# Patient Record
(demographics unavailable — no encounter records)

---

## 2025-01-15 NOTE — REVIEW OF SYSTEMS
[Patient Intake Form Reviewed] : Patient intake form was reviewed [Dysphagia] : dysphagia [Hoarseness] : hoarseness [Odynophagia] : odynophagia [Fever] : no fever [Fatigue] : no fatigue [Recent Change In Weight] : ~T no recent weight change [Eye Pain] : no eye pain [Vision Problems] : no vision problems [Chest Pain] : no chest pain [Lower Ext Edema] : no lower extremity edema [Shortness Of Breath] : no shortness of breath [SOB on Exertion] : no shortness of breath during exertion [Abdominal Pain] : no abdominal pain [Vomiting] : no vomiting [Constipation] : no constipation [Dysuria] : no dysuria [Joint Pain] : no joint pain [Skin Rash] : no skin rash [Easy Bleeding] : no tendency for easy bleeding [Easy Bruising] : no tendency for easy bruising [Swollen Glands] : no swollen glands

## 2025-01-15 NOTE — ASSESSMENT
[FreeTextEntry1] : 73 year old M with PMHx of SCC of the facial skin s/p parotidectomy in 2020 at [facility] with adjuvant RT who was diagnosed with supraglottic SCC.  -Discussed with Mr Finch definitive treatment of his cancer with chemoRT.  I explained that chemotherapy serves as a radiosensitizer to make the radiation more effective as well as to treat the blood stream and decrease the risk of metastases.  I have recommended weekly cisplatin and have provided patient drug information for further review. He will go for simulation next week and he will return to initiate chemoRT  01/15/25: Mr Finch returns for follow up after a long absence.  He had a CT neck with contrast done on 12/11/25 which again demonstrated an extensively inflitrative enhancing, irregular, and ulcerated soft tissue mass centered in the left lateral oropharynx measuring 6.9x 3.3x 3.3cm. Also involvement of the vocal cords and metastases to the left lymph nodes.  He reports being stronger and having gained weight since being in rehab. -Needs to see Dr Lucretia jiménez who will likely need to do another simulation given the amount of time since initial evaluation. If creatinine is still WNL, will plan for weekly cisplatin and he will return to initiate treatment

## 2025-01-15 NOTE — REASON FOR VISIT
[Follow-Up Visit] : a follow-up [Initial Consultation] : an initial consultation [FreeTextEntry2] : SCC

## 2025-01-15 NOTE — HISTORY OF PRESENT ILLNESS
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  He c/o throat pain and dysphagia x 1 year, worsening over the past few months. CT neck at [facility] showed a left lower pharyngeal/supraglottic laryngeal mass with epiglottic involvment. Enlarged left level 2A LN, likely metastatic.     Laryngoscopy with Dr. Donis showed larynx with mass involving the lateral L. BOT, supraglottic larynx and extending towards the pyriform sinus.  DL with biopsy of the larynx and supraglottic mass showed squamous cell carcinoma, moderately differentiated. Perineural invasion is seen.  Staging PET showed a focus of uptake at the left palatine tonsil region extends inferiorly along the posterior tongue to the infraglottic region. This is seen with an intensely FDG-avid large left level IIB node. Smaller nodes further inferiorly on the left at level 2/3 as well as a suspected small right level 2/3 node. Findings are collectively consistent with left tonsillar cancer with left neck metastatic lymphadenopathy, with additional smaller nodes bilaterally raising the possibility of additional metastatic disease.  Increased uptake anteromedially at the right true vocal cord. Intense activity seen posterior to the left true vocal cord, possibly within the esophagus, which appears thickened and asymmetric. Further evaluation may be helpful to exclude additional pathology.  Focus of increased uptake in the anterior portion of the face, potentially within the subcutaneous region of the right nasolabial fold or involving the bony maxilla. Uncertainty exists regarding the precise location. 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  Hazy nodularity anterior to the right major fissure, along with atelectasis. This is likely postinfectious or inflammatory. [de-identified] : 01/15/25: Mr Finch returns for follow up after a long absence.  He had a CT neck with contrast done on 12/11/25 which again demonstrated an extensively inflitrative enhancing, irregular, and ulcerated soft tissue mass centered in the left lateral oropharynx measuring 6.9x 3.3x 3.3cm. Also involvement of the vocal cords and metastases to the left lymph nodes.  He reports being stronger and having gained weight since being in rehab.

## 2025-02-20 NOTE — VITALS
[Maximal Pain Intensity: 3/10] : 3/10 [Least Pain Intensity: 1/10] : 1/10 [70: Cares for self; unalbe to carry on normal activity or do active work.] : 70: Cares for self; unable to carry on normal activity or do active work. [ECOG Performance Status: 2 - Ambulatory and capable of all self care but unable to carry out any work activities] : Performance Status: 2 - Ambulatory and capable of all self care but unable to carry out any work activities. Up and about more than 50% of waking hours [1 - Distress Level] : Distress Level: 1

## 2025-02-20 NOTE — DISEASE MANAGEMENT
[Clinical] : TNM Stage: c [TTNM] : 3 [NTNM] : 2 [MTNM] : 0 [IV] : IV [de-identified] : 494 [de-identified] : 5589 [de-identified] : supraglottic larnx and neck

## 2025-02-20 NOTE — DISEASE MANAGEMENT
[Clinical] : TNM Stage: c [TTNM] : 3 [NTNM] : 2 [MTNM] : 0 [IV] : IV [de-identified] : 807 [de-identified] : 7049 [de-identified] : supraglottic larnx and neck

## 2025-02-24 NOTE — DISEASE MANAGEMENT
[Clinical] : TNM Stage: c [IV] : IV [TTNM] : 3 [NTNM] : 2 [MTNM] : 0 [de-identified] : 7860 [de-identified] : 0289 [de-identified] : supraglottic larnx and neck

## 2025-02-24 NOTE — VITALS
[Maximal Pain Intensity: 3/10] : 3/10 [Least Pain Intensity: 1/10] : 1/10 [70: Cares for self; unalbe to carry on normal activity or do active work.] : 70: Cares for self; unable to carry on normal activity or do active work. [1 - Distress Level] : Distress Level: 1

## 2025-02-26 NOTE — ASSESSMENT
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  -Dr Jeffers discussed with Mr Finch definitive treatment of his cancer with chemoRT and explained that chemotherapy serves as a radiosensitizer to make the radiation more effective as well as to treat the blood stream and decrease the risk of metastases.   -recommended weekly cisplatin and RT -initially seen on 9/4/24 finally returned for follow up after a long absence on 1/15/25.   -restaging CT neck with contrast done on 12/11/24 which again demonstrated an extensively inflitrative enhancing, irregular, and ulcerated soft tissue mass centered in the left lateral oropharynx measuring 6.9x 3.3x 3.3cm. Also involvement of the vocal cords and metastases to the left lymph nodes.   -started CRT with weekly cisplatin on 2/12/25  -sent to ED after treatment last week for PEG revision, patient went to [facility] where PEG was replaced.  ANALI Ashton will follow  -2L of fluid via PEG daily -tolerated treatment well so far with expected side effects but not yet severe -RTC Qday for RT, Qweek for Cisplatin and Q7-14 days for MD/PA follow ups [Future Reassessment of Pain Scale] : Future reassessment of pain scale    [Curative] : Goals of care discussed with patient: Curative [Palliative Care Plan] : not applicable at this time

## 2025-02-26 NOTE — HISTORY OF PRESENT ILLNESS
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  He c/o throat pain and dysphagia x 1 year, worsening over the past few months. CT neck at [facility] showed a left lower pharyngeal/supraglottic laryngeal mass with epiglottic involvment. Enlarged left level 2A LN, likely metastatic.     Laryngoscopy with Dr. Donis showed larynx with mass involving the lateral L. BOT, supraglottic larynx and extending towards the pyriform sinus.  DL with biopsy of the larynx and supraglottic mass showed squamous cell carcinoma, moderately differentiated. Perineural invasion is seen.  Staging PET showed a focus of uptake at the left palatine tonsil region extends inferiorly along the posterior tongue to the infraglottic region. This is seen with an intensely FDG-avid large left level IIB node. Smaller nodes further inferiorly on the left at level 2/3 as well as a suspected small right level 2/3 node. Findings are collectively consistent with left tonsillar cancer with left neck metastatic lymphadenopathy, with additional smaller nodes bilaterally raising the possibility of additional metastatic disease.  Increased uptake anteromedially at the right true vocal cord. Intense activity seen posterior to the left true vocal cord, possibly within the esophagus, which appears thickened and asymmetric. Further evaluation may be helpful to exclude additional pathology.  Focus of increased uptake in the anterior portion of the face, potentially within the subcutaneous region of the right nasolabial fold or involving the bony maxilla. Uncertainty exists regarding the precise location. 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  Hazy nodularity anterior to the right major fissure, along with atelectasis. This is likely postinfectious or inflammatory. [de-identified] : 01/15/25: Mr Finch returns for follow up after a long absence.  He had a CT neck with contrast done on 12/11/25 which again demonstrated an extensively inflitrative enhancing, irregular, and ulcerated soft tissue mass centered in the left lateral oropharynx measuring 6.9x 3.3x 3.3cm. Also involvement of the vocal cords and metastases to the left lymph nodes.  He reports being stronger and having gained weight since being in rehab.  2/26/25 Patient presents for follow up and C3 of CRT with weekly Cisplatin for supraglottic SCC. + Fatigue. + Xerostomia. + Excessive thick oral secretions. Remains NPO, 100% of calories via PEG. Denies N/V. No recent fevers/chills.

## 2025-03-03 NOTE — DISEASE MANAGEMENT
[Clinical] : TNM Stage: c [TTNM] : 3 [NTNM] : 2 [MTNM] : 0 [IV] : IV [de-identified] : 2515 [de-identified] : 4759 [de-identified] : supraglottic larnx and neck

## 2025-03-10 NOTE — DISEASE MANAGEMENT
[Clinical] : TNM Stage: c [TTNM] : 3 [NTNM] : 2 [MTNM] : 0 [IV] : IV [de-identified] : 3000 cGy [de-identified] : 6000 cGy [de-identified] : supraglottic larnx and neck

## 2025-03-10 NOTE — DISEASE MANAGEMENT
[Clinical] : TNM Stage: c [TTNM] : 3 [NTNM] : 2 [MTNM] : 0 [IV] : IV [de-identified] : 3000 cGy [de-identified] : 6000 cGy [de-identified] : supraglottic larnx and neck

## 2025-03-10 NOTE — DISEASE MANAGEMENT
[Clinical] : TNM Stage: c [TTNM] : 3 [NTNM] : 2 [MTNM] : 0 [IV] : IV [de-identified] : 3000 cGy [de-identified] : 6000 cGy [de-identified] : supraglottic larnx and neck

## 2025-03-10 NOTE — REASON FOR VISIT
[Other: _____] : [unfilled] [Routine On-Treatment] : a routine on-treatment visit for [Head and Neck Cancer] : head and neck cancer

## 2025-03-21 NOTE — DISEASE MANAGEMENT
[Clinical] : TNM Stage: c [IV] : IV [TTNM] : 3 [NTNM] : 2 [MTNM] : 0 [de-identified] : 3800 cGy [de-identified] : 6000 cGy [de-identified] : supraglottic larnx and neck

## 2025-03-21 NOTE — REVIEW OF SYSTEMS
[Dysphagia: Grade 1 - Symptomatic, able to eat regular diet] : Dysphagia: Grade 1 - Symptomatic, able to eat regular diet [Edema Limbs: Grade 0] : Edema Limbs: Grade 0  [Fatigue: Grade 0] : Fatigue: Grade 0 [Localized Edema: Grade 0] : Localized Edema: Grade 0  [Neck Edema: Grade 0] : Neck Edema: Grade 0 [Mucositis Oral: Grade 0] : Mucositis Oral: Grade 0  [Xerostomia: Grade 1 - Symptomatic (e.g., dry or thick saliva) without significant dietary alteration; unstimulated saliva flow >0.2 ml/min] : Xerostomia: Grade 1 - Symptomatic (e.g., dry or thick saliva) without significant dietary alteration; unstimulated saliva flow >0.2 ml/min [Dysgeusia: Grade 0] : Dysgeusia: Grade 0 [Skin Hyperpigmentation: Grade 0] : Skin Hyperpigmentation: Grade 0

## 2025-03-21 NOTE — PHYSICAL EXAM
[Normal] : oriented to person, place and time, the affect was normal, the mood was normal and not anxious [Extraocular Movements] : extraocular movements were intact [Outer Ear] : the ears and nose were normal in appearance [de-identified] : dry mucous membranes, no significant mucositis

## 2025-03-24 NOTE — REVIEW OF SYSTEMS
[Edema Limbs: Grade 0] : Edema Limbs: Grade 0  [Fatigue: Grade 0] : Fatigue: Grade 0 [Localized Edema: Grade 0] : Localized Edema: Grade 0  [Neck Edema: Grade 0] : Neck Edema: Grade 0 [Mucositis Oral: Grade 0] : Mucositis Oral: Grade 0  [Xerostomia: Grade 1 - Symptomatic (e.g., dry or thick saliva) without significant dietary alteration; unstimulated saliva flow >0.2 ml/min] : Xerostomia: Grade 1 - Symptomatic (e.g., dry or thick saliva) without significant dietary alteration; unstimulated saliva flow >0.2 ml/min [Dysgeusia: Grade 0] : Dysgeusia: Grade 0 [Skin Hyperpigmentation: Grade 0] : Skin Hyperpigmentation: Grade 0

## 2025-03-24 NOTE — DISEASE MANAGEMENT
[Clinical] : TNM Stage: c [IV] : IV [TTNM] : 3 [NTNM] : 2 [MTNM] : 0 [de-identified] : 3800 cGy [de-identified] : 6000 cGy [de-identified] : supraglottic larnx and neck

## 2025-03-27 NOTE — HISTORY OF PRESENT ILLNESS
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  He c/o throat pain and dysphagia x 1 year, worsening over the past few months. CT neck at [facility] showed a left lower pharyngeal/supraglottic laryngeal mass with epiglottic involvment. Enlarged left level 2A LN, likely metastatic.     Laryngoscopy with Dr. Donis showed larynx with mass involving the lateral L. BOT, supraglottic larynx and extending towards the pyriform sinus.  DL with biopsy of the larynx and supraglottic mass showed squamous cell carcinoma, moderately differentiated. Perineural invasion is seen.  Staging PET showed a focus of uptake at the left palatine tonsil region extends inferiorly along the posterior tongue to the infraglottic region. This is seen with an intensely FDG-avid large left level IIB node. Smaller nodes further inferiorly on the left at level 2/3 as well as a suspected small right level 2/3 node. Findings are collectively consistent with left tonsillar cancer with left neck metastatic lymphadenopathy, with additional smaller nodes bilaterally raising the possibility of additional metastatic disease.  Increased uptake anteromedially at the right true vocal cord. Intense activity seen posterior to the left true vocal cord, possibly within the esophagus, which appears thickened and asymmetric. Further evaluation may be helpful to exclude additional pathology.  Focus of increased uptake in the anterior portion of the face, potentially within the subcutaneous region of the right nasolabial fold or involving the bony maxilla. Uncertainty exists regarding the precise location. 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  Hazy nodularity anterior to the right major fissure, along with atelectasis. This is likely postinfectious or inflammatory. [de-identified] : 01/15/25: Mr Finch returns for follow up after a long absence.  He had a CT neck with contrast done on 12/11/25 which again demonstrated an extensively inflitrative enhancing, irregular, and ulcerated soft tissue mass centered in the left lateral oropharynx measuring 6.9x 3.3x 3.3cm. Also involvement of the vocal cords and metastases to the left lymph nodes.  He reports being stronger and having gained weight since being in rehab.  2/26/25 Patient presents for follow up and C3 of CRT with weekly Cisplatin for supraglottic SCC.   + Fatigue. + Xerostomia. + Excessive thick oral secretions. Remains NPO, 100% of calories via PEG. Denies N/V. No recent fevers/chills.  3/26/25 Patient presents for follow up and C4 of CRT with weekly Cisplatin for supraglottic SCC.  Treatment delayed by hospitalization for sepsis.  Feels back to baseline. 100% calories via PEG.  + Xerostomia. + Odynophagia. Thick secretions currently resolved. No diarrhea. No fevers since discharge from hospital.

## 2025-03-27 NOTE — HISTORY OF PRESENT ILLNESS
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  He c/o throat pain and dysphagia x 1 year, worsening over the past few months. CT neck at [facility] showed a left lower pharyngeal/supraglottic laryngeal mass with epiglottic involvment. Enlarged left level 2A LN, likely metastatic.     Laryngoscopy with Dr. Donis showed larynx with mass involving the lateral L. BOT, supraglottic larynx and extending towards the pyriform sinus.  DL with biopsy of the larynx and supraglottic mass showed squamous cell carcinoma, moderately differentiated. Perineural invasion is seen.  Staging PET showed a focus of uptake at the left palatine tonsil region extends inferiorly along the posterior tongue to the infraglottic region. This is seen with an intensely FDG-avid large left level IIB node. Smaller nodes further inferiorly on the left at level 2/3 as well as a suspected small right level 2/3 node. Findings are collectively consistent with left tonsillar cancer with left neck metastatic lymphadenopathy, with additional smaller nodes bilaterally raising the possibility of additional metastatic disease.  Increased uptake anteromedially at the right true vocal cord. Intense activity seen posterior to the left true vocal cord, possibly within the esophagus, which appears thickened and asymmetric. Further evaluation may be helpful to exclude additional pathology.  Focus of increased uptake in the anterior portion of the face, potentially within the subcutaneous region of the right nasolabial fold or involving the bony maxilla. Uncertainty exists regarding the precise location. 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  Hazy nodularity anterior to the right major fissure, along with atelectasis. This is likely postinfectious or inflammatory. [de-identified] : 01/15/25: Mr Finch returns for follow up after a long absence.  He had a CT neck with contrast done on 12/11/25 which again demonstrated an extensively inflitrative enhancing, irregular, and ulcerated soft tissue mass centered in the left lateral oropharynx measuring 6.9x 3.3x 3.3cm. Also involvement of the vocal cords and metastases to the left lymph nodes.  He reports being stronger and having gained weight since being in rehab.  2/26/25 Patient presents for follow up and C3 of CRT with weekly Cisplatin for supraglottic SCC.   + Fatigue. + Xerostomia. + Excessive thick oral secretions. Remains NPO, 100% of calories via PEG. Denies N/V. No recent fevers/chills.  3/26/25 Patient presents for follow up and C4 of CRT with weekly Cisplatin for supraglottic SCC.  Treatment delayed by hospitalization for sepsis.  Feels back to baseline. 100% calories via PEG.  + Xerostomia. + Odynophagia. Thick secretions currently resolved. No diarrhea. No fevers since discharge from hospital.

## 2025-03-27 NOTE — ASSESSMENT
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  -Dr Jeffers discussed with Mr Finch definitive treatment of his cancer with chemoRT and explained that chemotherapy serves as a radiosensitizer to make the radiation more effective as well as to treat the blood stream and decrease the risk of metastases.   -recommended weekly cisplatin and RT -initially seen on 9/4/24 finally returned for follow up after a long absence on 1/15/25.   -restaging CT neck with contrast done on 12/11/24 which again demonstrated an extensively inflitrative enhancing, irregular, and ulcerated soft tissue mass centered in the left lateral oropharynx measuring 6.9x 3.3x 3.3cm. Also involvement of the vocal cords and metastases to the left lymph nodes.   -started CRT with weekly cisplatin on 2/12/25  -sent to ED after treatment last week for PEG revision, patient went to [facility] where PEG was replaced.  ANALI Ashton will follow  -2L of fluid via PEG daily -treatment delayed by long hospitalization for sepsis.  Patient looks well today especially considering this long ICU admission.  CBC shows anemia but near baseline. -patient looks ready to resume chemotherapy portion of treatment. Resumed RT on 3/24 -RTC Qday for RT, Qweek for Cisplatin and Q7-14 days for MD/PA follow ups

## 2025-03-27 NOTE — ASSESSMENT
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  -Dr Jeffers discussed with Mr Finch definitive treatment of his cancer with chemoRT and explained that chemotherapy serves as a radiosensitizer to make the radiation more effective as well as to treat the blood stream and decrease the risk of metastases.   -recommended weekly cisplatin and RT -initially seen on 9/4/24 finally returned for follow up after a long absence on 1/15/25.   -restaging CT neck with contrast done on 12/11/24 which again demonstrated an extensively inflitrative enhancing, irregular, and ulcerated soft tissue mass centered in the left lateral oropharynx measuring 6.9x 3.3x 3.3cm. Also involvement of the vocal cords and metastases to the left lymph nodes.   -started CRT with weekly cisplatin on 2/12/25  -sent to ED after treatment last week for PEG revision, patient went to [facility] where PEG was replaced.  ANALI Ashton will follow  -2L of fluid via PEG daily -treatment delayed by long hospitalization for sepsis.  Patient looks well today especially considering this long ICU admission.  CBC shows anemia but near baseline. -patient looks ready to resume chemotherapy portion of treatment. Resumed RT on 3/24 -RTC Qday for RT, Qweek for Cisplatin and Q7-14 days for MD/PA follow ups

## 2025-03-31 NOTE — DISEASE MANAGEMENT
[Clinical] : TNM Stage: c [TTNM] : 3 [NTNM] : 2 [MTNM] : 0 [IV] : IV [de-identified] : 5000 cGy [de-identified] : 6000 cGy [de-identified] : supraglottic larnx and neck

## 2025-04-07 NOTE — DISEASE MANAGEMENT
[Clinical] : TNM Stage: c [TTNM] : 3 [NTNM] : 2 [MTNM] : 0 [IV] : IV [de-identified] : 6000 cGy [de-identified] : 7000 cGy [de-identified] : supraglottic larnx and neck

## 2025-04-09 NOTE — PHYSICAL EXAM
[Restricted in physically strenuous activity but ambulatory and able to carry out work of a light or sedentary nature] : Status 1- Restricted in physically strenuous activity but ambulatory and able to carry out work of a light or sedentary nature, e.g., light house work, office work [Normal] : affect appropriate [de-identified] : in wheelchair during visit, able to ambulate independently  [de-identified] : easy WOB [de-identified] : RRR

## 2025-04-09 NOTE — REVIEW OF SYSTEMS
[Patient Intake Form Reviewed] : Patient intake form was reviewed [Fatigue] : fatigue [Dysphagia] : dysphagia [Hoarseness] : hoarseness [Odynophagia] : odynophagia [Mucosal Pain] : mucosal pain [Fever] : no fever [Recent Change In Weight] : ~T no recent weight change [Eye Pain] : no eye pain [Vision Problems] : no vision problems [Chest Pain] : no chest pain [Lower Ext Edema] : no lower extremity edema [Shortness Of Breath] : no shortness of breath [SOB on Exertion] : no shortness of breath during exertion [Abdominal Pain] : no abdominal pain [Vomiting] : no vomiting [Constipation] : no constipation [Dysuria] : no dysuria [Joint Pain] : no joint pain [Skin Rash] : no skin rash [Easy Bleeding] : no tendency for easy bleeding [Easy Bruising] : no tendency for easy bruising [Swollen Glands] : no swollen glands [FreeTextEntry2] : +fatigue [FreeTextEntry4] : +dysphagia; +odynophagia; +hoarseness; +mucous production

## 2025-04-09 NOTE — ASSESSMENT
[Future Reassessment of Pain Scale] : Future reassessment of pain scale    [Curative] : Goals of care discussed with patient: Curative [Palliative Care Plan] : not applicable at this time [FreeTextEntry1] : 73 year old M with PMHx of SCC of the facial skin s/p parotidectomy in 2020 at [facility] with adjuvant RT who was diagnosed with supraglottic SCC.  -Dr Jeffers discussed with Mr Finch definitive treatment of his cancer with chemoRT and explained that chemotherapy serves as a radiosensitizer to make the radiation more effective as well as to treat the blood stream and decrease the risk of metastases.   -recommended weekly cisplatin and RT -initially seen on 9/4/24 finally returned for follow up after a long absence on 1/15/25.   -restaging CT neck with contrast done on 12/11/24 which again demonstrated an extensively inflitrative enhancing, irregular, and ulcerated soft tissue mass centered in the left lateral oropharynx measuring 6.9x 3.3x 3.3cm. Also involvement of the vocal cords and metastases to the left lymph nodes.   -started CRT with weekly cisplatin on 2/12/25  -sent to ED after treatment last week for PEG revision, patient went to [facility] where PEG was replaced.  ANALI Ashton will follow  -2L of fluid via PEG daily -treatment delayed by long hospitalization for sepsis.  Patient looks well today especially considering this long ICU admission.  CBC shows anemia but near baseline. -patient looks ready to resume chemotherapy portion of treatment. Resumed RT on 3/24/25 - Scheduled to complete RT on 04/14/2025 -RTC Qday for RT, Qweek for Cisplatin and Q7-14 days for MD/PA follow ups - F/U in 1 month for evaluation after completion of treatment (05/2025)  # Anemia - 03/31/2025 CBC: Hgb 8.6 (L) - 04/07/2025 CBC: Hgb 8.3 (L) - Scheduled for blood transfusion on 04/09/2024; reviewed R/B/A

## 2025-04-09 NOTE — HISTORY OF PRESENT ILLNESS
[T: ___] : T[unfilled] [N: ___] : N[unfilled] [M: ___] : M[unfilled] [de-identified] : 73 year old M with PMHx of SCC of the facial skin s/p parotidectomy in 2020 at [facility] with adjuvant RT who was diagnosed with supraglottic SCC.  He c/o throat pain and dysphagia x 1 year, worsening over the past few months. CT neck at [facility] showed a left lower pharyngeal/supraglottic laryngeal mass with epiglottic involvment. Enlarged left level 2A LN, likely metastatic.     Laryngoscopy with Dr. Donis showed larynx with mass involving the lateral L. BOT, supraglottic larynx and extending towards the pyriform sinus.  DL with biopsy of the larynx and supraglottic mass showed squamous cell carcinoma, moderately differentiated. Perineural invasion is seen.  Staging PET showed a focus of uptake at the left palatine tonsil region extends inferiorly along the posterior tongue to the infraglottic region. This is seen with an intensely FDG-avid large left level IIB node. Smaller nodes further inferiorly on the left at level 2/3 as well as a suspected small right level 2/3 node. Findings are collectively consistent with left tonsillar cancer with left neck metastatic lymphadenopathy, with additional smaller nodes bilaterally raising the possibility of additional metastatic disease.  Increased uptake anteromedially at the right true vocal cord. Intense activity seen posterior to the left true vocal cord, possibly within the esophagus, which appears thickened and asymmetric. Further evaluation may be helpful to exclude additional pathology.  Focus of increased uptake in the anterior portion of the face, potentially within the subcutaneous region of the right nasolabial fold or involving the bony maxilla. Uncertainty exists regarding the precise location. 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  Hazy nodularity anterior to the right major fissure, along with atelectasis. This is likely postinfectious or inflammatory. [de-identified] : 01/15/25: Mr Finch returns for follow up after a long absence.  He had a CT neck with contrast done on 12/11/25 which again demonstrated an extensively inflitrative enhancing, irregular, and ulcerated soft tissue mass centered in the left lateral oropharynx measuring 6.9x 3.3x 3.3cm. Also involvement of the vocal cords and metastases to the left lymph nodes.  He reports being stronger and having gained weight since being in rehab.  2/26/25 Patient presents for follow up and C3 of CRT with weekly Cisplatin for supraglottic SCC.   + Fatigue. + Xerostomia. + Excessive thick oral secretions. Remains NPO, 100% of calories via PEG. Denies N/V. No recent fevers/chills.  3/26/25 Patient presents for follow up and C4 of CRT with weekly Cisplatin for supraglottic SCC.  Treatment delayed by hospitalization for sepsis.  Feels back to baseline. 100% calories via PEG.  + Xerostomia. + Odynophagia. Thick secretions currently resolved. No diarrhea. No fevers since discharge from hospital.   04/09/2025: Mr. Finch returns for follow up. Doing okay. Last day of RT is 04/14/2025. Complains of some dysphagia. Does not sleep great throughout the night but endorses the staff waking him to take pain medications. Able to fall back asleep after. Complains of being tired all the time. Scheduled for blood transfusion on 04/09/2025. Has appointment with ENT scheduled for after completion of treatment.

## 2025-04-09 NOTE — ADDENDUM
[FreeTextEntry1] : This note was written by Dea Monsivais on 04/09/2025, acting solely as a scribe for Dr. Tristan Jeffers MD. I have documented the information dictated during the patient encounter for the following sections: RFV, HPI, ROS, PE, ASSESSMENT/PLAN.   I personally performed the services described in the documentation, reviewed the documentation recorded by the scribe in my presence, and it accurately and completely records my words and actions.

## 2025-04-14 NOTE — DISEASE MANAGEMENT
[Clinical] : TNM Stage: c [TTNM] : 3 [MTNM] : 0 [NTNM] : 2 [IV] : IV [de-identified] : 6000 cGy [de-identified] : 7000 cGy [de-identified] : supraglottic larnx and neck

## 2025-04-22 NOTE — PHYSICAL EXAM
[Restricted in physically strenuous activity but ambulatory and able to carry out work of a light or sedentary nature] : Status 1- Restricted in physically strenuous activity but ambulatory and able to carry out work of a light or sedentary nature, e.g., light house work, office work [Normal] : affect appropriate [de-identified] : in wheelchair during visit, able to ambulate independently  [de-identified] : easy WOB [de-identified] : RRR

## 2025-04-22 NOTE — ASSESSMENT
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  -Dr Jeffers discussed with Mr Finch definitive treatment of his cancer with chemoRT and explained that chemotherapy serves as a radiosensitizer to make the radiation more effective as well as to treat the blood stream and decrease the risk of metastases.   -recommended weekly cisplatin and RT -initially seen on 9/4/24 finally returned for follow up after a long absence on 1/15/25.   -restaging CT neck with contrast done on 12/11/24 which again demonstrated an extensively inflitrative enhancing, irregular, and ulcerated soft tissue mass centered in the left lateral oropharynx measuring 6.9x 3.3x 3.3cm. Also involvement of the vocal cords and metastases to the left lymph nodes.   -started CRT with weekly cisplatin on 2/12/25  -sent to ED after treatment last week for PEG revision, patient went to [facility] where PEG was replaced.  ANALI Ashton will follow  -2L of fluid via PEG daily -treatment delayed by long hospitalization for sepsis, resumed RT on 3/24/25 -completed RT on 4/14/2025 -slowly recovering from treatment sequela  -RTC in 2-3 weeks to evaluate continued improvement   # Anemia - 03/31/2025 CBC: Hgb 8.6 (L) - 04/07/2025 CBC: Hgb 8.3 (L) - Scheduled for blood transfusion on 04/09/2024; reviewed R/B/A

## 2025-04-22 NOTE — HISTORY OF PRESENT ILLNESS
[T: ___] : T[unfilled] [N: ___] : N[unfilled] [M: ___] : M[unfilled] [de-identified] : 73 year old M with PMHx of SCC of the facial skin s/p parotidectomy in 2020 at [facility] with adjuvant RT who was diagnosed with supraglottic SCC.  He c/o throat pain and dysphagia x 1 year, worsening over the past few months. CT neck at [facility] showed a left lower pharyngeal/supraglottic laryngeal mass with epiglottic involvment. Enlarged left level 2A LN, likely metastatic.     Laryngoscopy with Dr. Donis showed larynx with mass involving the lateral L. BOT, supraglottic larynx and extending towards the pyriform sinus.  DL with biopsy of the larynx and supraglottic mass showed squamous cell carcinoma, moderately differentiated. Perineural invasion is seen.  Staging PET showed a focus of uptake at the left palatine tonsil region extends inferiorly along the posterior tongue to the infraglottic region. This is seen with an intensely FDG-avid large left level IIB node. Smaller nodes further inferiorly on the left at level 2/3 as well as a suspected small right level 2/3 node. Findings are collectively consistent with left tonsillar cancer with left neck metastatic lymphadenopathy, with additional smaller nodes bilaterally raising the possibility of additional metastatic disease.  Increased uptake anteromedially at the right true vocal cord. Intense activity seen posterior to the left true vocal cord, possibly within the esophagus, which appears thickened and asymmetric. Further evaluation may be helpful to exclude additional pathology.  Focus of increased uptake in the anterior portion of the face, potentially within the subcutaneous region of the right nasolabial fold or involving the bony maxilla. Uncertainty exists regarding the precise location. 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  Hazy nodularity anterior to the right major fissure, along with atelectasis. This is likely postinfectious or inflammatory. [de-identified] : 01/15/25: Mr Finch returns for follow up after a long absence.  He had a CT neck with contrast done on 12/11/25 which again demonstrated an extensively inflitrative enhancing, irregular, and ulcerated soft tissue mass centered in the left lateral oropharynx measuring 6.9x 3.3x 3.3cm. Also involvement of the vocal cords and metastases to the left lymph nodes.  He reports being stronger and having gained weight since being in rehab.  2/26/25 Patient presents for follow up and C3 of CRT with weekly Cisplatin for supraglottic SCC.   + Fatigue. + Xerostomia. + Excessive thick oral secretions. Remains NPO, 100% of calories via PEG. Denies N/V. No recent fevers/chills.  3/26/25 Patient presents for follow up and C4 of CRT with weekly Cisplatin for supraglottic SCC.  Treatment delayed by hospitalization for sepsis.  Feels back to baseline. 100% calories via PEG.  + Xerostomia. + Odynophagia. Thick secretions currently resolved. No diarrhea. No fevers since discharge from hospital.   04/09/2025: Mr. Finch returns for follow up. Doing okay. Last day of RT is 04/14/2025. Complains of some dysphagia. Does not sleep great throughout the night but endorses the staff waking him to take pain medications. Able to fall back asleep after. Complains of being tired all the time. Scheduled for blood transfusion on 04/09/2025. Has appointment with ENT scheduled for after completion of treatment.   4/22/25 Patient presents for follow up s/p CRT with weekly Cisplatin for supraglottic SCC, completed on 4/14/25 + Dysphagia and odynophagia but majority of calories come via PEG so weight stable.  + Xerostomia. + Excessive thick oral secretions. + Diarrhea, responds to imodium. + GERD, responds to mylanta. No N/V. No fevers.

## 2025-05-05 NOTE — ASSESSMENT
[Future Reassessment of Pain Scale] : Future reassessment of pain scale    [Curative] : Goals of care discussed with patient: Curative [Palliative Care Plan] : not applicable at this time [FreeTextEntry1] : 73 year old M with PMHx of SCC of the facial skin s/p parotidectomy in 2020 at [facility] with adjuvant RT who was diagnosed with supraglottic SCC.  -Dr Jeffers discussed with Mr Finch definitive treatment of his cancer with chemoRT and explained that chemotherapy serves as a radiosensitizer to make the radiation more effective as well as to treat the blood stream and decrease the risk of metastases.   -recommended weekly cisplatin and RT -initially seen on 9/4/24 finally returned for follow up after a long absence on 1/15/25.   -restaging CT neck with contrast done on 12/11/24 which again demonstrated an extensively inflitrative enhancing, irregular, and ulcerated soft tissue mass centered in the left lateral oropharynx measuring 6.9x 3.3x 3.3cm. Also involvement of the vocal cords and metastases to the left lymph nodes.   -started CRT with weekly cisplatin on 2/12/25  -sent to ED after treatment last week for PEG revision, patient went to [facility] where PEG was replaced.  ANALI Ashton will follow  -2L of fluid via PEG daily -treatment delayed by long hospitalization for sepsis, resumed RT on 3/24/25 -completed RT on 4/14/2025 05/05/25: Mr Finch returns for follow up.  Gained a few pounds, feeling better since completing chemoRT on 04/14/25.  He has not followed up with Dr Yu, will see Dr Boykin later this week. -Recovering well, will defer imaging with PET and CT neck to Dr Boykin.   Will arrange for follow up with Dr Yu, follow up in 2 months after imaging to review results.

## 2025-05-05 NOTE — HISTORY OF PRESENT ILLNESS
[T: ___] : T[unfilled] [N: ___] : N[unfilled] [M: ___] : M[unfilled] [de-identified] : 73 year old M with PMHx of SCC of the facial skin s/p parotidectomy in 2020 at [facility] with adjuvant RT who was diagnosed with supraglottic SCC.  He c/o throat pain and dysphagia x 1 year, worsening over the past few months. CT neck at [facility] showed a left lower pharyngeal/supraglottic laryngeal mass with epiglottic involvment. Enlarged left level 2A LN, likely metastatic.     Laryngoscopy with Dr. Donis showed larynx with mass involving the lateral L. BOT, supraglottic larynx and extending towards the pyriform sinus.  DL with biopsy of the larynx and supraglottic mass showed squamous cell carcinoma, moderately differentiated. Perineural invasion is seen.  Staging PET showed a focus of uptake at the left palatine tonsil region extends inferiorly along the posterior tongue to the infraglottic region. This is seen with an intensely FDG-avid large left level IIB node. Smaller nodes further inferiorly on the left at level 2/3 as well as a suspected small right level 2/3 node. Findings are collectively consistent with left tonsillar cancer with left neck metastatic lymphadenopathy, with additional smaller nodes bilaterally raising the possibility of additional metastatic disease.  Increased uptake anteromedially at the right true vocal cord. Intense activity seen posterior to the left true vocal cord, possibly within the esophagus, which appears thickened and asymmetric. Further evaluation may be helpful to exclude additional pathology.  Focus of increased uptake in the anterior portion of the face, potentially within the subcutaneous region of the right nasolabial fold or involving the bony maxilla. Uncertainty exists regarding the precise location. 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  Hazy nodularity anterior to the right major fissure, along with atelectasis. This is likely postinfectious or inflammatory. [de-identified] : 01/15/25: Mr Finch returns for follow up after a long absence.  He had a CT neck with contrast done on 12/11/25 which again demonstrated an extensively inflitrative enhancing, irregular, and ulcerated soft tissue mass centered in the left lateral oropharynx measuring 6.9x 3.3x 3.3cm. Also involvement of the vocal cords and metastases to the left lymph nodes.  He reports being stronger and having gained weight since being in rehab.  2/26/25 Patient presents for follow up and C3 of CRT with weekly Cisplatin for supraglottic SCC.   + Fatigue. + Xerostomia. + Excessive thick oral secretions. Remains NPO, 100% of calories via PEG. Denies N/V. No recent fevers/chills.  3/26/25 Patient presents for follow up and C4 of CRT with weekly Cisplatin for supraglottic SCC.  Treatment delayed by hospitalization for sepsis.  Feels back to baseline. 100% calories via PEG.  + Xerostomia. + Odynophagia. Thick secretions currently resolved. No diarrhea. No fevers since discharge from hospital.   04/09/2025: Mr. Finch returns for follow up. Doing okay. Last day of RT is 04/14/2025. Complains of some dysphagia. Does not sleep great throughout the night but endorses the staff waking him to take pain medications. Able to fall back asleep after. Complains of being tired all the time. Scheduled for blood transfusion on 04/09/2025. Has appointment with ENT scheduled for after completion of treatment.   4/22/25 Patient presents for follow up s/p CRT with weekly Cisplatin for supraglottic SCC, completed on 4/14/25 + Dysphagia and odynophagia but majority of calories come via PEG so weight stable.  + Xerostomia. + Excessive thick oral secretions. + Diarrhea, responds to imodium. + GERD, responds to mylanta. No N/V. No fevers.   05/05/25: Mr Finch returns for follow up.  Gained a few pounds, feeling better since completing chemoRT on 04/14/25.  He has not followed up with Dr Yu, will see Dr Boykin later this week.

## 2025-05-05 NOTE — PHYSICAL EXAM
[Restricted in physically strenuous activity but ambulatory and able to carry out work of a light or sedentary nature] : Status 1- Restricted in physically strenuous activity but ambulatory and able to carry out work of a light or sedentary nature, e.g., light house work, office work [Normal] : affect appropriate [de-identified] : in wheelchair during visit, able to ambulate independently  [de-identified] : easy WOB [de-identified] : RRR

## 2025-05-14 NOTE — HISTORY OF PRESENT ILLNESS
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  Mr. 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  The patient completed radiation therapy to the larynx on 4/14/2025.  He presents for post-treatment evaluation.  At last on-treatment visit: He notes xerostomia with thick oral secretions continue.  Dysphagia and odynophagia symptoms lessened.  However, per [place] facility, patient remains NPO. Oral cavity free of debris. Notes throat pain, receiving Tylenol at care facility.  Today Mr. Finch notes xerostomia continues, although not as severely.  Patient reports he failed a recent swallow test and remains NPO with the exception of ice chips. Oral cavity free of debris. Mucus membranes very dry. Notes mild throat pain, receives Tylenol at care facility.

## 2025-05-14 NOTE — VITALS
[Maximal Pain Intensity: 3/10] : 3/10 [Least Pain Intensity: 0/10] : 0/10 [Pain Description/Quality: ___] : Pain description/quality: [unfilled] [Pain Duration: ___] : Pain duration: [unfilled] [Pain Location: ___] : Pain Location: [unfilled] [Pain Interferes with ADLs] : Pain interferes with activities of daily living. [OTC] : OTC [Adjuvant (neuroleptics)] : adjuvant (neuroleptics) [70: Cares for self; unalbe to carry on normal activity or do active work.] : 70: Cares for self; unable to carry on normal activity or do active work.

## 2025-05-14 NOTE — DISEASE MANAGEMENT
[Clinical] : TNM Stage: c [IV] : IV [FreeTextEntry4] : dermatofibrosarcoma [TTNM] : 3 [NTNM] : 2 [MTNM] : 0 [de-identified] : 7000 cGy [de-identified] : supraglottic Larynx and neck

## 2025-05-14 NOTE — DISEASE MANAGEMENT
[Clinical] : TNM Stage: c [IV] : IV [FreeTextEntry4] : dermatofibrosarcoma [TTNM] : 3 [NTNM] : 2 [MTNM] : 0 [de-identified] : 7000 cGy [de-identified] : supraglottic Larynx and neck

## 2025-05-14 NOTE — HISTORY OF PRESENT ILLNESS
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. 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.  Mr. 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  The patient completed radiation therapy to the larynx on 4/14/2025.  He presents for post-treatment evaluation.  At last on-treatment visit: He notes xerostomia with thick oral secretions continue.  Dysphagia and odynophagia symptoms lessened.  However, per [place] facility, patient remains NPO. Oral cavity free of debris. Notes throat pain, receiving Tylenol at care facility.  Today Mr. Finch notes xerostomia continues, although not as severely.  Patient reports he failed a recent swallow test and remains NPO with the exception of ice chips. Oral cavity free of debris. Mucus membranes very dry. Notes mild throat pain, receives Tylenol at care facility.

## 2025-05-19 NOTE — CONSULT LETTER
[Dear  ___] : Dear  [unfilled], [Courtesy Letter:] : I had the pleasure of seeing your patient, [unfilled], in my office today. [Please see my note below.] : Please see my note below. [Consult Closing:] : Thank you very much for allowing me to participate in the care of this patient.  If you have any questions, please do not hesitate to contact me. [Sincerely,] : Sincerely, [FreeTextEntry2] : Torsten Nunez MD [FreeTextEntry3] : Luis Marrufo MD, FACS Chief of Otolaryngology and Head & Neck Surgery - [facility]  - Dept. of Otolaryngology - [facility] [phone]   [DrKaycee  ___] : Dr. VAZQUEZ [DrKaycee ___] : Dr. VAZQUEZ [___] : [unfilled]

## 2025-05-19 NOTE — PHYSICAL EXAM
[FreeTextEntry1] : Frail 72 y/o M [Midline] : trachea located in midline position [Laryngoscopy Performed] : laryngoscopy was performed, see procedure section for findings [Normal] : no rashes

## 2025-05-19 NOTE — PROCEDURE
[Unable to Cooperate with Mirror] : patient unable to cooperate with mirror [Lesion] : lesion identified by mirror examination needing further evaluation [None] : none [Flexible Endoscope] : examined with the flexible endoscope [Serial Number: ___] : Serial Number: [unfilled] [Normal] : posterior cricoid area had healthy pink mucosa in the interarytenoid area and the esophageal inlet [de-identified] :  Surgilube [de-identified] : Epiglottis almost gone.  Crusting present on L.  No clear tumor present

## 2025-05-19 NOTE — HISTORY OF PRESENT ILLNESS
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 Mr. Finch was consulted here initially in August 2024 with a one-year history of increasing dysphagia and severe throat pain, decreased PO intake and considerable weight loss.  He underwent laryngoscopy with Dr. Donis and was diagnosed with SCCA of the supraglottic larynx ([id number], stage IV). He completed radiation and chemotherapy 4/14/25 to larynx. Reports feeling tired and having throat pain, currently has PEG has gained about 10 lbs in 10 months. Initial biopsy completed by Dr. Donis. No recent PET scan scheduled 6-8 weeks

## 2025-05-19 NOTE — ASSESSMENT
[FreeTextEntry1] : Pt to get PET/CT in early July.  If there is any residual disease he will need to be consider for laryngectomy.    For now, I recommend continuation of PEG feeds.